# Patient Record
Sex: FEMALE | Race: BLACK OR AFRICAN AMERICAN | NOT HISPANIC OR LATINO | Employment: FULL TIME | RURAL
[De-identification: names, ages, dates, MRNs, and addresses within clinical notes are randomized per-mention and may not be internally consistent; named-entity substitution may affect disease eponyms.]

---

## 2023-07-17 ENCOUNTER — HOSPITAL ENCOUNTER (EMERGENCY)
Facility: HOSPITAL | Age: 48
Discharge: HOME | End: 2023-07-17
Payer: COMMERCIAL

## 2023-07-17 VITALS
TEMPERATURE: 99 F | HEART RATE: 95 BPM | RESPIRATION RATE: 16 BRPM | DIASTOLIC BLOOD PRESSURE: 79 MMHG | BODY MASS INDEX: 24.1 KG/M2 | OXYGEN SATURATION: 97 % | SYSTOLIC BLOOD PRESSURE: 133 MMHG | HEIGHT: 63 IN | WEIGHT: 136 LBS

## 2023-07-17 DIAGNOSIS — S89.90XA KNEE INJURY: Primary | ICD-10-CM

## 2023-07-17 PROCEDURE — 99283 EMERGENCY DEPT VISIT LOW MDM: CPT

## 2023-07-17 PROCEDURE — 99283 EMERGENCY DEPT VISIT LOW MDM: CPT | Mod: ,,, | Performed by: NURSE PRACTITIONER

## 2023-07-17 PROCEDURE — 99283 PR EMERGENCY DEPT VISIT,LEVEL III: ICD-10-PCS | Mod: ,,, | Performed by: NURSE PRACTITIONER

## 2023-07-17 NOTE — DISCHARGE INSTRUCTIONS
Use knee immobilizer and crutches.  Follow-up with orthopedics; you should be contacted with an appointment.  Return to the emergency department for any increase in symptoms or for any other new or worrisome symptoms

## 2023-07-17 NOTE — ED PROVIDER NOTES
Encounter Date: 7/17/2023       History     Chief Complaint   Patient presents with    Leg Pain     Right leg pain     48-year-old female presents to the emergency department to be evaluated for right knee pain.  She jumped down about 2 ft, landed on her feet and injured her right knee 2 days ago and has had right knee pain since that time.  Denies head injury, headache, neck pain, back pain, loss of consciousness.    The history is provided by the patient.   Leg Pain   The incident occurred yesterday. Pertinent negatives include no numbness, no inability to bear weight, no loss of motion, no muscle weakness, no loss of sensation and no tingling.   Review of patient's allergies indicates:   Allergen Reactions    Flagyl [metronidazole]      No past medical history on file.  No past surgical history on file.  No family history on file.     Review of Systems   Constitutional:  Negative for chills and fever.   Respiratory:  Negative for shortness of breath.    Cardiovascular:  Negative for chest pain.   Neurological:  Negative for tingling and numbness.   All other systems reviewed and are negative.    Physical Exam     Initial Vitals [07/17/23 1020]   BP Pulse Resp Temp SpO2   133/79 95 16 98.6 °F (37 °C) 97 %      MAP       --         Physical Exam    Vitals reviewed.  Constitutional: She appears well-developed and well-nourished.   Neck: Neck supple.   Cardiovascular:  Normal rate and regular rhythm.           Pulmonary/Chest: Breath sounds normal.   Abdominal: Abdomen is soft. Bowel sounds are normal. She exhibits no distension and no mass. There is no abdominal tenderness. There is no rebound and no guarding.   Musculoskeletal:         General: Normal range of motion.      Cervical back: Neck supple.      Right upper leg: Normal.      Right knee: Swelling and effusion present. No deformity, erythema, ecchymosis or lacerations. Normal range of motion. Tenderness present.      Right lower leg: Normal.      Neurological: She is alert and oriented to person, place, and time. She has normal strength. GCS score is 15. GCS eye subscore is 4. GCS verbal subscore is 5. GCS motor subscore is 6.   Skin: Skin is warm and dry. Capillary refill takes less than 2 seconds.   Psychiatric: She has a normal mood and affect.       Medical Screening Exam   See Full Note    ED Course   Procedures  Labs Reviewed - No data to display       Imaging Results              X-Ray Knee 1 or 2 View Right (Final result)  Result time 23 10:54:29      Final result by Austin Alex DO (23 10:54:29)                   Impression:      As above.    Point of Service: [facility]      Electronically signed by: Austin Alex  Date:    2023  Time:    10:54               Narrative:    EXAMINATION:  XR KNEE 1 OR 2 VIEW RIGHT    CLINICAL HISTORY:  Unspecified injury of unspecified lower leg, initial encounter    COMPARISON:  None    TECHNIQUE:  Frontal and lateral views of the right knee.    FINDINGS:  No convincing acute fracture or dislocation demonstrated. No concerning radiopaque foreign body visualized.                                       Medications - No data to display  Medical Decision Makin-year-old female presents to the emergency department to be evaluated for right knee pain.  She jumped down about 2 ft, landed on her feet and injured her right knee 2 days ago and has had right knee pain since that time.  Denies head injury, headache, neck pain, back pain, loss of consciousness.  Diagnosis: Knee injury  Patient was given immobilizer and crutches in the emergency department  Patient referred to Orthopedics                       Clinical Impression:   Final diagnoses:  [S89.90XA] Knee injury (Primary)        ED Disposition Condition    Discharge Stable          ED Prescriptions    None       Follow-up Information    None          RORO Givens  23 [id number]

## 2023-07-17 NOTE — Clinical Note
DyllanJeri Paniagua accompanied their family member to the emergency department on 7/17/2023. They may return to work on 07/18/2023.      If you have any questions or concerns, please don't hesitate to call.       RN

## 2023-07-17 NOTE — ED TRIAGE NOTES
Patient presents to ED with complaint of right leg pain after stepping down wrong and it bending back.

## 2023-07-17 NOTE — Clinical Note
"Lucina HALENATALIE Castillo was seen and treated in our emergency department on 7/17/2023.  She may return to work on 07/20/2023.       If you have any questions or concerns, please don't hesitate to call.       RN    "

## 2023-07-17 NOTE — Clinical Note
"Lucina"PERLA Castillo was seen and treated in our emergency department on 7/17/2023.  She may return to work on 07/21/2023.       If you have any questions or concerns, please don't hesitate to call.      Betzy Ly RN    "

## 2023-07-21 ENCOUNTER — OFFICE VISIT (OUTPATIENT)
Dept: ORTHOPEDICS | Facility: CLINIC | Age: 48
End: 2023-07-21
Payer: COMMERCIAL

## 2023-07-21 VITALS
HEIGHT: 63 IN | OXYGEN SATURATION: 97 % | TEMPERATURE: 98 F | DIASTOLIC BLOOD PRESSURE: 74 MMHG | RESPIRATION RATE: 16 BRPM | BODY MASS INDEX: 28.35 KG/M2 | WEIGHT: 160 LBS | SYSTOLIC BLOOD PRESSURE: 124 MMHG | HEART RATE: 76 BPM

## 2023-07-21 DIAGNOSIS — S89.90XA KNEE INJURY: ICD-10-CM

## 2023-07-21 DIAGNOSIS — S80.01XA CONTUSION OF RIGHT KNEE, INITIAL ENCOUNTER: Primary | ICD-10-CM

## 2023-07-21 PROBLEM — R73.01 IMPAIRED FASTING GLUCOSE: Status: ACTIVE | Noted: 2023-07-21

## 2023-07-21 PROBLEM — Z51.89 OTHER SPECIFIED REHABILITATION PROCEDURE: Status: ACTIVE | Noted: 2023-07-21

## 2023-07-21 PROBLEM — M25.612 STIFFNESS OF LEFT SHOULDER JOINT: Status: ACTIVE | Noted: 2023-07-21

## 2023-07-21 PROBLEM — M25.579 ANKLE PAIN: Status: ACTIVE | Noted: 2023-07-21

## 2023-07-21 PROBLEM — R76.11 NONSPECIFIC REACTION TO TUBERCULIN TEST: Status: ACTIVE | Noted: 2023-07-21

## 2023-07-21 PROBLEM — Z23 NEED FOR PROPHYLACTIC VACCINATION WITH TETANUS-DIPHTHERIA (TD): Status: ACTIVE | Noted: 2023-07-21

## 2023-07-21 PROCEDURE — 1159F MED LIST DOCD IN RCRD: CPT | Mod: CPTII,,, | Performed by: NURSE PRACTITIONER

## 2023-07-21 PROCEDURE — 1159F PR MEDICATION LIST DOCUMENTED IN MEDICAL RECORD: ICD-10-PCS | Mod: CPTII,,, | Performed by: NURSE PRACTITIONER

## 2023-07-21 PROCEDURE — 3074F PR MOST RECENT SYSTOLIC BLOOD PRESSURE < 130 MM HG: ICD-10-PCS | Mod: CPTII,,, | Performed by: NURSE PRACTITIONER

## 2023-07-21 PROCEDURE — 1160F PR REVIEW ALL MEDS BY PRESCRIBER/CLIN PHARMACIST DOCUMENTED: ICD-10-PCS | Mod: CPTII,,, | Performed by: NURSE PRACTITIONER

## 2023-07-21 PROCEDURE — 3008F BODY MASS INDEX DOCD: CPT | Mod: CPTII,,, | Performed by: NURSE PRACTITIONER

## 2023-07-21 PROCEDURE — 99215 OFFICE O/P EST HI 40 MIN: CPT | Mod: PBBFAC | Performed by: NURSE PRACTITIONER

## 2023-07-21 PROCEDURE — 3078F PR MOST RECENT DIASTOLIC BLOOD PRESSURE < 80 MM HG: ICD-10-PCS | Mod: CPTII,,, | Performed by: NURSE PRACTITIONER

## 2023-07-21 PROCEDURE — 3078F DIAST BP <80 MM HG: CPT | Mod: CPTII,,, | Performed by: NURSE PRACTITIONER

## 2023-07-21 PROCEDURE — 3074F SYST BP LT 130 MM HG: CPT | Mod: CPTII,,, | Performed by: NURSE PRACTITIONER

## 2023-07-21 PROCEDURE — 99203 PR OFFICE/OUTPT VISIT, NEW, LEVL III, 30-44 MIN: ICD-10-PCS | Mod: S$PBB,,, | Performed by: NURSE PRACTITIONER

## 2023-07-21 PROCEDURE — 99203 OFFICE O/P NEW LOW 30 MIN: CPT | Mod: S$PBB,,, | Performed by: NURSE PRACTITIONER

## 2023-07-21 PROCEDURE — 3008F PR BODY MASS INDEX (BMI) DOCUMENTED: ICD-10-PCS | Mod: CPTII,,, | Performed by: NURSE PRACTITIONER

## 2023-07-21 PROCEDURE — 1160F RVW MEDS BY RX/DR IN RCRD: CPT | Mod: CPTII,,, | Performed by: NURSE PRACTITIONER

## 2023-07-21 RX ORDER — MELOXICAM 15 MG/1
15 TABLET ORAL DAILY PRN
Qty: 30 TABLET | Refills: 1 | Status: SHIPPED | OUTPATIENT
Start: 2023-07-21 | End: 2023-08-20

## 2023-07-21 NOTE — LETTER
July 21, 2023      [facility]  [address]  [facility] [address]  Phone: [phone]  Fax: [phone]       Patient: Lucina Castillo   YOB: 1975  Date of Visit: 07/21/2023    To Whom It May Concern:    KIRILL Castillo  was at [facility] on 07/21/2023. She may return to work/school on 07/24/2023 with no restrictions. If you have any questions or concerns, or if I can be of further assistance, please do not hesitate to contact me.    Sincerely,    ALEXANDER Salazar

## 2023-07-21 NOTE — PATIENT INSTRUCTIONS
Safety guidelines and activity guidelines discussed with the patient.  Verbalized understanding.  We will schedule for physical therapy for range of motion strengthening exercises of her right knee 2 to 3 times a week x6 weeks.  Discontinue knee immobilizer.  May use crutches as she feels she needs.  Prescription Mobic 15 mg 1 p.o. daily p.r.n. knee pain number 30 with 1 refills sent to [facility] on [address] [place], [place] per patient request.  Given work excuse to return to work on 07/24/2023 without restriction.  Return to orthopedic clinic if she fails to improve or sooner as needed.

## 2023-07-21 NOTE — PROGRESS NOTES
48-year-old female presents ambulatory to orthopedic clinic complaint of injury to her right knee.  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  She subsequently presented to [facility] on the following morning where x-rays were obtained of her right knee AP and lateral view which showed no evidence of acute fracture, dislocation or pathologic bone.  She was placed in a knee immobilizer given crutches and referred to orthopedics.  She indicates her pain is in the suprapatellar region.    PE: She is noted be ambulating with crutches nonweightbearing right lower extremity with knee immobilizer in place.  In general she is awake, alert oriented appropriately.  No acute distress noted.  Respirations even unlabored.  Skin is warm dry and intact.  No acute distress noted.  Physical exam right lower extremity she is good motion right hip without elicitation pain.  Physical exam right knee knee immobilizer removed.  Skin is warm dry and intact.  No soft tissue swelling noted.  No intra-articular effusion appreciated clinically.  Range motion right knee 0° extension further flexion approximately 95° which time she experiences discomfort about her knee.  She points to the superior area of the patella as well as mediolateral sides of the patella.  Anterior drawer test negative.  Posterior drawer test is negative.  Valgus stress test is negative.  Varus stress test is negative.  No to palpation quad tendon there is some tenderness initially palpation patella tendon however this was only at the beginning of the exam when palpated again a few minutes later she states there is no pain over the patella tendon.  There is mild tenderness palpation mediolateral joint lines.      Impression:  Hyperextension injury causing contusion right knee     Plan:  Safety guidelines and activity guidelines discussed with the  Spoke with patient, recommended standard COVID precautions and for her to touch base with PCP. Patient verbalized understanding. patient.  Verbalized understanding.  We will schedule for physical therapy for range of motion strengthening exercises of her right knee 2 to 3 times a week x6 weeks.  Discontinue knee immobilizer.  May use crutches as she feels she needs.  Prescription Mobic 15 mg 1 p.o. daily p.r.n. knee pain number 30 with 1 refills sent to [facility] on [address] and [place], [place] per patient request.  Given work excuse to return to work on 07/24/2023 without restriction.  Return to orthopedic clinic if she fails to improve or sooner as needed.

## 2023-07-26 ENCOUNTER — CLINICAL SUPPORT (OUTPATIENT)
Dept: REHABILITATION | Facility: HOSPITAL | Age: 48
End: 2023-07-26
Payer: COMMERCIAL

## 2023-07-26 DIAGNOSIS — S80.01XA CONTUSION OF RIGHT KNEE, INITIAL ENCOUNTER: ICD-10-CM

## 2023-07-26 PROCEDURE — 97162 PT EVAL MOD COMPLEX 30 MIN: CPT

## 2023-07-26 PROCEDURE — 97110 THERAPEUTIC EXERCISES: CPT

## 2023-07-26 NOTE — PLAN OF CARE
[facility]   Physical Therapy Initial Evaluation      Name: Lucina Castillo  Clinic Number: [id number]    Therapy Diagnosis:   Encounter Diagnosis   Name Primary?    Contusion of right knee, initial encounter         Physician: Jose Carreon FNP    Physician Orders: PT Eval and Treat  Medical Diagnosis from Referral: contusion of R knee  Evaluation Date: 7/26/2023  Authorization Period Expiration: 07/20/24  Plan of Care Expiration: 08/25/23  Progress Note Due: 08/25/23  Visit # / Visits authorized: 1/ Pending   FOTO: 59 at IE    Precautions: Standard     Time In: 04:16PM  Time Out: 04:50PM  Total Appointment Time (timed & untimed codes): 34 minutes    Subjective     Date of onset: 7/15/23    History of current condition - LUCINA reports to physical therapy with reports of R knee pain dating back to 7/15/23 when she was doing yard work and jumped off a small ledge and felt her knee hyperextend and buckle.  States the knee swelled up almost immediately and it was difficult to walk.  On the 17th she finally went to the ER due to her knee not feeling any better and had x-rays taken with no significant findings.  Was given an immobilizer and referred to orthopedics.  Saw Jose Carreon on the 21st and prescribed mobic and d/c'd from immobilizer.  Referred her for therapy at that time as well.    At this point reports that her knee really hasn't seemed to improve.  No longer using crutches, just walking through the pain.  States that bending her knee really worsens her pain the most, not so much pain with straightening the knee.  If she sits for too long and it gets stiff she will really struggle from a pain standpoint.  Will get some pain at night when trying to sleep will get increased pain if she twists or rolls over a certain way.  Hasn't noticed any clicking, catching, or popping in the knee.  Struggles going up and down stairs, with going up stairs worse than down.  Able to drive but  does have pain in the knee with driving.     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  Was in the  prior to this job.  Lives alone in a single story home with multiple steps up to the front door.     Falls: None    Imaging: Radiographs in the ER with no significant findings.     Prior Therapy: For shoulder injury over 2 years ago.    Social History: Lives alone  Occupation:  at [employer]  Prior Level of Function: No limitations  Current Level of Function: Mod pain w/ ambulation, stair negotiation, and work tasks.     Pain:  Current 4/10, worst 8/10, best 2/10   Location: Diffuse R knee more lateral knee at patellar tendon.   Description: Throbbing and Tight  Aggravating Factors: Standing, Bending, Walking, Flexing, and Getting out of bed/chair  Easing Factors: ice and rest    Patients goals: to be able to work through full shift without pain.      Medical History:   No past medical history on file.    Surgical History:   Lucina Castillo  has no past surgical history on file.    Medications:   Lucina has a current medication list which includes the following prescription(s): meloxicam.    Allergies:   Review of patient's allergies indicates:   Allergen Reactions    Flagyl [metronidazole]         Objective      Observation : Amb w/ slight quad avoidance gait on the R knee w/ mild infrapatellar swelling present and tenderness at lateral jt line.        Range of Motion/Strength :           Left Extremity                                                               Right Extremity   AROM Strength  Location  AROM   Strength   130 5/5   Knee    Flexion (140) 77 3-/5   +3 5/5                Extension (0) +1 3-/5     No lag with SLR on L, -6 deg lag from +1 deg to -5 deg on R.     Knee Special Tests :     B. Knee  Lochman's test: right Negative left Negative  Anterior drawer: right Negative left Negative  Posterior drawer: right Negative left Negative  Varus stress test: right  Negative left Negative  Valgus stress test: right Negative left Negative  PFJ grind test: right Negative left Positive  McMurrays: right Negative left Positive    Functional Impairments :  Unable to perform a bodyweight squat, unable to negotiate stairs reciprocally, unable to perform a lateral step down at this time due to pain in R knee.     Limitation/Restriction for FOTO Knee Survey    Therapist reviewed FOTO scores for Lucina Castillo on 7/26/2023.   FOTO documents entered into [employer] - see Media section.    Limitation Score: 41%         Treatment     Total Treatment time (time-based codes) separate from Evaluation: 12 minutes     LUCINA received the treatments listed below:  THERAPEUTIC EXERCISES to develop strength, ROM, and flexibility for 12 minutes including [place] SLR [id number] holds, Heel slide w/ ankle pump x 3 min, Towel Calf Stretch [id number].       Patient Education and Home Exercises     Education provided:   - Diagnosis, prognosis, plan of care    Written Home Exercises Provided: yes. Exercises were reviewed and LUCINA was able to demonstrate them prior to the end of the session.  LUCINA demonstrated good  understanding of the education provided. See EMR under Patient Instructions for exercises provided during therapy sessions.    Assessment     Lucina is a 48 y.o. female referred to outpatient Physical Therapy with a medical diagnosis of contusion of R knee. Patient presents with decreased R knee ROM, strength, flexibility, and functional capacity as a result of recent injury.  Ligamentously the knee feels darío at this time but with possible meniscal irritation and signs most consistent with patellofemoral irritation at this point.  Will work to address the above deficits through skilled therapy at this time.      Patient prognosis is Good.   Patient will benefit from skilled outpatient Physical Therapy to address the deficits stated above and in the chart below, provide patient /family education,  "and to maximize patientt's level of independence.     Plan of care discussed with patient: Yes  Patient's spiritual, cultural and educational needs considered and patient is agreeable to the plan of care and goals as stated below:     Anticipated Barriers for therapy: high demand work activities, possible fear avoidance behaviors.     Medical Necessity is demonstrated by the following  History  Co-morbidities and personal factors that may impact the plan of care [] LOW: no personal factors / co-morbidities  [x] MODERATE: 1-2 personal factors / co-morbidities  [] HIGH: 3+ personal factors / co-morbidities    Moderate / High Support Documentation:   Co-morbidities affecting plan of care: high demand work activities, possible fear avoidance behaviors.     Personal Factors:   coping style     Examination  Body Structures and Functions, activity limitations and participation restrictions that may impact the plan of care [] LOW: addressing 1-2 elements  [x] MODERATE: 3+ elements  [] HIGH: 4+ elements (please support below)    Moderate / High Support Documentation: ROM, strength, flexibility, functional deficits of R knee.      Clinical Presentation [] LOW: stable  [x] MODERATE: Evolving  [] HIGH: Unstable     Decision Making/ Complexity Score: moderate       Goals:  Short Term Goals: 2 weeks   Pt will be independent with HEP for continued progression beyond skilled therapy.  Pt will be able to sleep through the night without waking due to R knee pain.  Pt will present to therapy with 0 deg resting R knee extension with no lag with SLR to demonstrate improved terminal extensor mechanism.  Pt will be able to return to driving without pain or limitation from R knee.      Long Term Goals: 4 weeks   Pt will be able to negotiate stairs reciprocally without pain or limitation from R LE.  Pt will be able to perform a lateral step down from a 6" step without pain or limitation from R knee.   Pt will be able to perform a bodyweight " squat without pain or limitation from R knee.   Pt will be able to amb I without deviation or AD.  Plan     Plan of care Certification: 7/26/2023 to 08/25/23.    Outpatient Physical Therapy 2 times weekly for 4 weeks to include the following interventions: Electrical Stimulation Unattended NMES, Manual Therapy, Neuromuscular Re-ed, Patient Education, Therapeutic Activities, and Therapeutic Exercise.     Simeon Michelle, PT

## 2023-08-03 ENCOUNTER — CLINICAL SUPPORT (OUTPATIENT)
Dept: REHABILITATION | Facility: HOSPITAL | Age: 48
End: 2023-08-03
Payer: COMMERCIAL

## 2023-08-03 DIAGNOSIS — S80.01XA CONTUSION OF RIGHT KNEE, INITIAL ENCOUNTER: Primary | ICD-10-CM

## 2023-08-03 DIAGNOSIS — R26.2 DIFFICULTY WALKING: ICD-10-CM

## 2023-08-03 DIAGNOSIS — M25.561 ACUTE PAIN OF RIGHT KNEE: ICD-10-CM

## 2023-08-03 DIAGNOSIS — M25.661 STIFFNESS OF RIGHT KNEE: ICD-10-CM

## 2023-08-03 PROCEDURE — 97530 THERAPEUTIC ACTIVITIES: CPT

## 2023-08-03 PROCEDURE — 97110 THERAPEUTIC EXERCISES: CPT

## 2023-08-03 PROCEDURE — 97112 NEUROMUSCULAR REEDUCATION: CPT

## 2023-08-03 NOTE — PROGRESS NOTES
"    Physical Therapy Treatment Note     Name: Lucina Castillo  Clinic Number: [id number]    Therapy Diagnosis: No diagnosis found.  Physician: Jose Carreon FNP    Visit Date: 8/3/2023    Physician Orders: PT Eval and Treat  Medical Diagnosis from Referral: contusion of R knee  Evaluation Date: 7/26/2023  Authorization Period Expiration: 07/20/24  Plan of Care Expiration: 08/25/23  Progress Note Due: 08/25/23  Visit # / Visits authorized: 2/9  FOTO: 59 at IE  PTA Visit #: 0/5    Time In: 04:03PM  Time Out: 04:48PM  Total Billable Time: 45 minutes    Precautions: Standard  Functional Level Prior to Evaluation: no limitations    Subjective     Pt reports: knee feeling a bit better.  Has done the HEP exercises "some".    Response to previous treatment: fair initial response  Functional change: working at this point with mod pain    Pain: 4/10  Location: R knee    Objective     LUCINA received therapeutic exercises to develop strength, endurance, ROM, and flexibility for 23 minutes including:  [place] SLR [id number] holds  Supine Ball Rolling Flexions x20  Prone Quad Sets 50m78eak  Prone Quad Stretch 6s68nvd  Hamstring Curls [id number]  Slant Board Stretch 5i12vfy    LUCINA participated in neuromuscular re-education activities to improve: Balance, Coordination, Kinesthetic, Sense, and Proprioception for 14 minutes. The following activities were included:  Long Bridging 02m7hou  QS w/ WS on slant Board 15p8mwp  Leg Press [id number] w/ rock into terminal extension    LUCINA participated in dynamic functional therapeutic activities to improve functional performance for 8  minutes, including:  Upright bike x 8 min    Home Exercises Provided and Patient Education Provided     Education provided: Review of HEP     Written Home Exercises Provided: Patient instructed to cont prior HEP.  Exercises were reviewed and LUCINA was able to demonstrate them prior to the end of the session.  LUCINA demonstrated good  understanding of " "the education provided.     See EMR under Patient Instructions for exercises provided prior visit.    Assessment     Reports feeling better and being on her feet all day at work today.  Still signs and symptoms just point to general contusion at this point with no clear pathology in the knee with pt really unable to verbalize symptoms that she is experiencing other than a general pulling when walking, and really can't even say where she is experiencing the pulling.  Did well with all exercises performed today, only reporting real discomfort with hamstring curls. Will continue with current plan at this time.   CHARLOTTE Is progressing well towards her goals.   Pt prognosis is Good.     Pt will continue to benefit from skilled outpatient physical therapy to address the deficits listed in the problem list box on initial evaluation, provide pt/family education and to maximize pt's level of independence in the home and community environment.     Pt's spiritual, cultural and educational needs considered and pt agreeable to plan of care and goals.     Anticipated barriers to physical therapy: possible symptom magnification, fear avoidance behaviors.     Goals:  Short Term Goals: 2 weeks   Pt will be independent with HEP for continued progression beyond skilled therapy.  Pt will be able to sleep through the night without waking due to R knee pain.  Pt will present to therapy with 0 deg resting R knee extension with no lag with SLR to demonstrate improved terminal extensor mechanism.  Pt will be able to return to driving without pain or limitation from R knee.      Long Term Goals: 4 weeks   Pt will be able to negotiate stairs reciprocally without pain or limitation from R LE.  Pt will be able to perform a lateral step down from a 6" step without pain or limitation from R knee.   Pt will be able to perform a bodyweight squat without pain or limitation from R knee.   Pt will be able to amb I without deviation or AD.  Plan    "   Plan of care Certification: 7/26/2023 to 08/25/23.     Outpatient Physical Therapy 2 times weekly for 4 weeks to include the following interventions: Electrical Stimulation Unattended NMES, Manual Therapy, Neuromuscular Re-ed, Patient Education, Therapeutic Activities, and Therapeutic Exercise.     Simeon Michelle, PT  8/3/2023

## 2023-08-08 ENCOUNTER — CLINICAL SUPPORT (OUTPATIENT)
Dept: REHABILITATION | Facility: HOSPITAL | Age: 48
End: 2023-08-08
Payer: COMMERCIAL

## 2023-08-08 DIAGNOSIS — S80.01XA CONTUSION OF RIGHT KNEE, INITIAL ENCOUNTER: Primary | ICD-10-CM

## 2023-08-08 DIAGNOSIS — M25.561 ACUTE PAIN OF RIGHT KNEE: ICD-10-CM

## 2023-08-08 PROCEDURE — 97530 THERAPEUTIC ACTIVITIES: CPT | Mod: CQ

## 2023-08-08 PROCEDURE — 97112 NEUROMUSCULAR REEDUCATION: CPT | Mod: CQ

## 2023-08-08 PROCEDURE — 97110 THERAPEUTIC EXERCISES: CPT | Mod: CQ

## 2023-08-08 NOTE — PROGRESS NOTES
"    Physical Therapy Treatment Note     Name: Lucina Castillo  Clinic Number: [id number]    Therapy Diagnosis:   Encounter Diagnoses   Name Primary?    Contusion of right knee, initial encounter Yes    Acute pain of right knee      Physician: Jose Carreon FNP    Visit Date: 8/8/2023    Physician Orders: PT Eval and Treat  Medical Diagnosis from Referral: contusion of R knee  Evaluation Date: 7/26/2023  Authorization Period Expiration: 07/20/24  Plan of Care Expiration: 08/25/23  Progress Note Due: 08/25/23  Visit # / Visits authorized: 3/9  FOTO: 59 at IE  PTA Visit #: 1/5    Time In: 4:52 pm  Time Out: 5:32 pm  Total Billable Time: 40 minutes    Precautions: Standard  Functional Level Prior to Evaluation: no limitations    Subjective     Pt reports: she was sore after last session; feeling better overall    Response to previous treatment: fair initial response  Functional change: working at this point with mod pain    Pain: 4/10  Location: R knee    Objective     LUCINA received therapeutic exercises to develop strength, endurance, ROM, and flexibility for 23 minutes including:  [place] SLR 6g7z8miq holds  Supine Ball Rolling Flexions x20  Prone Quad Sets 01l27rxw  Prone Quad Stretch 1d69niw  Hamstring Curls 9i93j97vyv  Slant Board Stretch 4n85uxa    LUCINA participated in neuromuscular re-education activities to improve: Balance, Coordination, Kinesthetic, Sense, and Proprioception for 9 minutes. The following activities were included:  Long Bridging 94r0qwt  QS w/ WS on slant Board 74t3wct  Leg Press 8k62o57vpt w/ rock into terminal extension      LUCINA participated in dynamic functional therapeutic activities to improve functional performance for 8  minutes, including:  Upright bike x 5 min  Step Up 4" 15x (B)    Home Exercises Provided and Patient Education Provided     Education provided: Review of HEP     Written Home Exercises Provided: Patient instructed to cont prior HEP.  Exercises were reviewed and " "CHARLOTTE was able to demonstrate them prior to the end of the session.  CHARLOTTE demonstrated good  understanding of the education provided.     See EMR under Patient Instructions for exercises provided prior visit.    Assessment     Pt is doing well just has some pain with exercises. Able to progress CKC exercises with some pain/fatigue noted. Pt is making good progress towards her goals just needs to continue to work to address weakness in quads. Will continue to progress as able.     CHARLOTTE Is progressing well towards her goals.   Pt prognosis is Good.     Pt will continue to benefit from skilled outpatient physical therapy to address the deficits listed in the problem list box on initial evaluation, provide pt/family education and to maximize pt's level of independence in the home and community environment.     Pt's spiritual, cultural and educational needs considered and pt agreeable to plan of care and goals.     Anticipated barriers to physical therapy: possible symptom magnification, fear avoidance behaviors.     Goals:  Short Term Goals: 2 weeks   Pt will be independent with HEP for continued progression beyond skilled therapy.  Pt will be able to sleep through the night without waking due to R knee pain.  Pt will present to therapy with 0 deg resting R knee extension with no lag with SLR to demonstrate improved terminal extensor mechanism.  Pt will be able to return to driving without pain or limitation from R knee.      Long Term Goals: 4 weeks   Pt will be able to negotiate stairs reciprocally without pain or limitation from R LE.  Pt will be able to perform a lateral step down from a 6" step without pain or limitation from R knee.   Pt will be able to perform a bodyweight squat without pain or limitation from R knee.   Pt will be able to amb I without deviation or AD.  Plan      Plan of care Certification: 7/26/2023 to 08/25/23.     Outpatient Physical Therapy 2 times weekly for 4 weeks to include the " following interventions: Electrical Stimulation Unattended NMES, Manual Therapy, Neuromuscular Re-ed, Patient Education, Therapeutic Activities, and Therapeutic Exercise.     Vishnu Purcell, PTA  8/8/2023

## 2023-08-10 ENCOUNTER — CLINICAL SUPPORT (OUTPATIENT)
Dept: REHABILITATION | Facility: HOSPITAL | Age: 48
End: 2023-08-10
Payer: COMMERCIAL

## 2023-08-10 DIAGNOSIS — S80.01XA CONTUSION OF RIGHT KNEE, INITIAL ENCOUNTER: Primary | ICD-10-CM

## 2023-08-10 DIAGNOSIS — M25.661 STIFFNESS OF RIGHT KNEE: ICD-10-CM

## 2023-08-10 PROCEDURE — 97110 THERAPEUTIC EXERCISES: CPT | Mod: CQ

## 2023-08-10 PROCEDURE — 97530 THERAPEUTIC ACTIVITIES: CPT | Mod: CQ

## 2023-08-10 PROCEDURE — 97112 NEUROMUSCULAR REEDUCATION: CPT | Mod: CQ

## 2023-08-10 NOTE — PROGRESS NOTES
"    Physical Therapy Treatment Note     Name: Lucina Castillo  Clinic Number: [id number]    Therapy Diagnosis:   Encounter Diagnoses   Name Primary?    Contusion of right knee, initial encounter Yes    Stiffness of right knee      Physician: Jose Carreon FNP    Visit Date: 8/10/2023    Physician Orders: PT Eval and Treat  Medical Diagnosis from Referral: contusion of R knee  Evaluation Date: 7/26/2023  Authorization Period Expiration: 07/20/24  Plan of Care Expiration: 08/25/23  Progress Note Due: 08/25/23  Visit # / Visits authorized: 4/9  FOTO: 59 at IE  PTA Visit #: 2/5    Time In: 1:47 pm  Time Out:  2:47 pm  Total Billable Time:  54 minutes  (6 min IM to R knee not billed) ( Simeon Michelle, PT participated in 13 min of treatment)    Precautions: Standard  Functional Level Prior to Evaluation: no limitations    Subjective     Pt reports: no pain @ rest.  Reports pain in knee with increased flexion.      Response to previous treatment: fair initial response  Functional change: working at this point with mod pain    Pain: 0/10  Location: R knee    Objective     LUCINA received therapeutic exercises to develop strength, endurance, ROM, and flexibility for 23 minutes including:  [place] SLR 1z8d3bny holds--2#  Supine Ball Rolling Flexions x20  Prone Quad Sets 62q33wyk  Prone Quad Stretch 3o99iyg  Hurdler Stretch--throughout session--30 sec holds    LUCINA participated in neuromuscular re-education activities to improve: Balance, Coordination, Kinesthetic, Sense, and Proprioception for 23 minutes. The following activities were included:  Long Bridging 39l1xrn  Leg Press 9x62z66msw w/ rock into terminal extension--SL lowering  Eccentric lowering--4" 4 x 5      LUCINA participated in dynamic functional therapeutic activities to improve functional performance for 8  minutes, including:  Upright bike x 8 min    IM to R knee x 6 min      Home Exercises Provided and Patient Education Provided     Education provided: " "Review of HEP     Written Home Exercises Provided: Patient instructed to cont prior HEP.  Exercises were reviewed and CHARLOTTE was able to demonstrate them prior to the end of the session.  CHARLOTTE demonstrated good  understanding of the education provided.     See EMR under Patient Instructions for exercises provided prior visit.    Assessment     Arrives without pain in R knee today.  Reports increased pain in patellar tendon with palpation and Increased knee flexion.  Most exercises were eccentric today.  Increased frequency of hurdler stretches today and ended with IM to patellar tendon.  Patient's complaints of pain have not been consistent from treatment to treatment.  Will continue to progress as able.     CHARLOTTE Is progressing well towards her goals.   Pt prognosis is Good.     Pt will continue to benefit from skilled outpatient physical therapy to address the deficits listed in the problem list box on initial evaluation, provide pt/family education and to maximize pt's level of independence in the home and community environment.     Pt's spiritual, cultural and educational needs considered and pt agreeable to plan of care and goals.     Anticipated barriers to physical therapy: possible symptom magnification, fear avoidance behaviors.     Goals:  Short Term Goals: 2 weeks   Pt will be independent with HEP for continued progression beyond skilled therapy.  Pt will be able to sleep through the night without waking due to R knee pain.  Pt will present to therapy with 0 deg resting R knee extension with no lag with SLR to demonstrate improved terminal extensor mechanism.  Pt will be able to return to driving without pain or limitation from R knee.      Long Term Goals: 4 weeks   Pt will be able to negotiate stairs reciprocally without pain or limitation from R LE.  Pt will be able to perform a lateral step down from a 6" step without pain or limitation from R knee.   Pt will be able to perform a bodyweight squat " without pain or limitation from R knee.   Pt will be able to amb I without deviation or AD.  Plan      Plan of care Certification: 7/26/2023 to 08/25/23.     Outpatient Physical Therapy 2 times weekly for 4 weeks to include the following interventions: Electrical Stimulation Unattended NMES, Manual Therapy, Neuromuscular Re-ed, Patient Education, Therapeutic Activities, and Therapeutic Exercise.     Trever Adams, PTA  8/10/2023

## 2023-08-15 ENCOUNTER — CLINICAL SUPPORT (OUTPATIENT)
Dept: REHABILITATION | Facility: HOSPITAL | Age: 48
End: 2023-08-15
Payer: COMMERCIAL

## 2023-08-15 DIAGNOSIS — R26.2 DIFFICULTY WALKING: ICD-10-CM

## 2023-08-15 DIAGNOSIS — M25.561 ACUTE PAIN OF RIGHT KNEE: ICD-10-CM

## 2023-08-15 DIAGNOSIS — S80.01XA CONTUSION OF RIGHT KNEE, INITIAL ENCOUNTER: Primary | ICD-10-CM

## 2023-08-15 DIAGNOSIS — M25.661 STIFFNESS OF RIGHT KNEE: ICD-10-CM

## 2023-08-15 PROCEDURE — 97110 THERAPEUTIC EXERCISES: CPT

## 2023-08-15 PROCEDURE — 97112 NEUROMUSCULAR REEDUCATION: CPT

## 2023-08-15 PROCEDURE — 97530 THERAPEUTIC ACTIVITIES: CPT

## 2023-08-15 NOTE — PROGRESS NOTES
"Physical Therapy Treatment Note     Name: Lucina Castillo  Clinic Number: [id number]    Therapy Diagnosis:   No diagnosis found.    Physician: Jose Carreon FNP    Visit Date: 8/15/2023    Physician Orders: PT Eval and Treat  Medical Diagnosis from Referral: contusion of R knee  Evaluation Date: 7/26/2023  Authorization Period Expiration: 07/20/24  Plan of Care Expiration: 08/25/23  Progress Note Due: 08/25/23  Visit # / Visits authorized: 5/9  FOTO: 59 at IE  PTA Visit #: 0/5    Time In: 04:47PM  Time Out:  05:30PM  Total Billable Time:  43 min    Precautions: Standard  Functional Level Prior to Evaluation: no limitations    Subjective     Pt reports: states the knee hurting a bit and feeling swollen on arrival.  Reports "some" compliance with HEP.     Response to previous treatment: fair initial response  Functional change: working at this point with mod pain    Pain: 5/10  Location: R knee    Objective     LUCINA received therapeutic exercises to develop strength, endurance, ROM, and flexibility for 12 minutes including:  Hamstring Curls 5i14l50ofq  [place] SLR 3l9z9bzb holds--2#  Prone Quad Sets 71l74wuk  Prone Quad Stretch 6v83hpu  Weighted Raman Stretch 6v0c8wsc each w/ 3lb cuff weights  Hurdler Stretch--throughout session--30 sec holds    LUCINA participated in neuromuscular re-education activities to improve: Balance, Coordination, Kinesthetic, Sense, and Proprioception for 23 minutes. The following activities were included:  QS w/ WS on slant Board 45n8xcw  Long Bridging 61f2uej  Leg Press 8n80f04xyv w/ rock into terminal extension--SL lowering  TRX Squats 3x10   Eccentric lowering--4" 2x5 (stopped due to pain in anteromedial knee)      LUCINA participated in dynamic functional therapeutic activities to improve functional performance for 8  minutes, including:  Upright bike x 8 min    Home Exercises Provided and Patient Education Provided     Education provided: Review of HEP     Written Home Exercises " "Provided: Patient instructed to cont prior HEP.  Exercises were reviewed and CHARLOTTE was able to demonstrate them prior to the end of the session.  CHARLOTTE demonstrated good  understanding of the education provided.     See EMR under Patient Instructions for exercises provided prior visit.    Assessment     Pain still dancing around at this point.  Didn't appear swollen and no effusion with stroke test but signs mostly pointing at this time to patellofemoral symptoms.  Would like to get pt more compliant with HEP and see if we can make some improvement here.  May suggest an orthotic at next visit to see if this alleviates some of her pain at work as well.  Will continue on for now.     CHARLOTTE Is progressing well towards her goals.   Pt prognosis is Good.     Pt will continue to benefit from skilled outpatient physical therapy to address the deficits listed in the problem list box on initial evaluation, provide pt/family education and to maximize pt's level of independence in the home and community environment.     Pt's spiritual, cultural and educational needs considered and pt agreeable to plan of care and goals.     Anticipated barriers to physical therapy: possible symptom magnification, fear avoidance behaviors.     Goals:  Short Term Goals: 2 weeks   Pt will be independent with HEP for continued progression beyond skilled therapy.  Pt will be able to sleep through the night without waking due to R knee pain.  Pt will present to therapy with 0 deg resting R knee extension with no lag with SLR to demonstrate improved terminal extensor mechanism.  Pt will be able to return to driving without pain or limitation from R knee.      Long Term Goals: 4 weeks   Pt will be able to negotiate stairs reciprocally without pain or limitation from R LE.  Pt will be able to perform a lateral step down from a 6" step without pain or limitation from R knee.   Pt will be able to perform a bodyweight squat without pain or limitation " from R knee.   Pt will be able to amb I without deviation or AD.  Plan      Plan of care Certification: 7/26/2023 to 08/25/23.     Outpatient Physical Therapy 2 times weekly for 4 weeks to include the following interventions: Electrical Stimulation Unattended NMES, Manual Therapy, Neuromuscular Re-ed, Patient Education, Therapeutic Activities, and Therapeutic Exercise.     Simeon Michelle, PT  8/15/2023

## 2023-08-17 ENCOUNTER — CLINICAL SUPPORT (OUTPATIENT)
Dept: REHABILITATION | Facility: HOSPITAL | Age: 48
End: 2023-08-17
Payer: COMMERCIAL

## 2023-08-17 DIAGNOSIS — S80.01XA CONTUSION OF RIGHT KNEE, INITIAL ENCOUNTER: Primary | ICD-10-CM

## 2023-08-17 PROCEDURE — 97140 MANUAL THERAPY 1/> REGIONS: CPT | Mod: CQ

## 2023-08-17 PROCEDURE — 97110 THERAPEUTIC EXERCISES: CPT | Mod: CQ

## 2023-08-17 PROCEDURE — 97112 NEUROMUSCULAR REEDUCATION: CPT | Mod: CQ

## 2023-08-17 NOTE — PROGRESS NOTES
"Physical Therapy Treatment Note     Name: Lucina Castillo  Clinic Number: [id number]    Therapy Diagnosis:   Encounter Diagnosis   Name Primary?    Contusion of right knee, initial encounter Yes       Physician: Jose Carreon FNP    Visit Date: 8/17/2023    Physician Orders: PT Eval and Treat  Medical Diagnosis from Referral: contusion of R knee  Evaluation Date: 7/26/2023  Authorization Period Expiration: 07/20/24  Plan of Care Expiration: 08/25/23  Progress Note Due: 08/25/23  Visit # / Visits authorized: 6/9  FOTO: 59 at IE  PTA Visit #: 1/5    Time In: 04:48 PM  Time Out:  05:29 pm  Total Billable Time:  41 min    Precautions: Standard  Functional Level Prior to Evaluation: no limitations    Subjective     Pt reports: she has some pain when on the bike but overall is improving    Response to previous treatment: fair initial response  Functional change: working at this point with mod pain    Pain: 5 /10  Location: R knee    Objective     LUCINA received therapeutic exercises to develop strength, endurance, ROM, and flexibility for 10 minutes including:  Hamstring Curls 3e31q31cbg  [place] SLR 3m4e6luk holds--2#  Prone Quad Sets 63b84yjp  Prone Quad Stretch 9d87xir  Weighted Raman Stretch 8g9d3lys each w/ 3lb cuff weights  Hurdler Stretch--throughout session--30 sec holds    LUCINA participated in neuromuscular re-education activities to improve: Balance, Coordination, Kinesthetic, Sense, and Proprioception for 23 minutes. The following activities were included:  QS w/ WS on slant Board 80p4toa  Long Bridging 80h0ocm  Leg Press 6c57w54mxe w/ rock into terminal extension--SL lowering  TRX Squats 3x10   Eccentric lowering--4" 2x5 (stopped due to pain in anteromedial knee)      LUCINA participated in dynamic functional therapeutic activities to improve functional performance for 8  minutes, including:  Upright bike x 8 min    Home Exercises Provided and Patient Education Provided     Education provided: Review of " "HEP     Written Home Exercises Provided: Patient instructed to cont prior HEP.  Exercises were reviewed and CHARLOTTE was able to demonstrate them prior to the end of the session.  CHARLOTTE demonstrated good  understanding of the education provided.     See EMR under Patient Instructions for exercises provided prior visit.    Assessment     Pt tolerated all therapeutic exercises with fatigue noted. Some pain when knee gets >90 degrees of flexion on leg press. Pt has some arch collapse when ambulating so pt was shown orthotics to correct that hopefully will help with PF pain. Will continue to progress as able. Educated pt to keep up with her HEP diligently.     CHARLOTTE Is progressing well towards her goals.   Pt prognosis is Good.     Pt will continue to benefit from skilled outpatient physical therapy to address the deficits listed in the problem list box on initial evaluation, provide pt/family education and to maximize pt's level of independence in the home and community environment.     Pt's spiritual, cultural and educational needs considered and pt agreeable to plan of care and goals.     Anticipated barriers to physical therapy: possible symptom magnification, fear avoidance behaviors.     Goals:  Short Term Goals: 2 weeks   Pt will be independent with HEP for continued progression beyond skilled therapy.  Pt will be able to sleep through the night without waking due to R knee pain.  Pt will present to therapy with 0 deg resting R knee extension with no lag with SLR to demonstrate improved terminal extensor mechanism.  Pt will be able to return to driving without pain or limitation from R knee.      Long Term Goals: 4 weeks   Pt will be able to negotiate stairs reciprocally without pain or limitation from R LE.  Pt will be able to perform a lateral step down from a 6" step without pain or limitation from R knee.   Pt will be able to perform a bodyweight squat without pain or limitation from R knee.   Pt will be able " to amb I without deviation or AD.  Plan      Plan of care Certification: 7/26/2023 to 08/25/23.     Outpatient Physical Therapy 2 times weekly for 4 weeks to include the following interventions: Electrical Stimulation Unattended NMES, Manual Therapy, Neuromuscular Re-ed, Patient Education, Therapeutic Activities, and Therapeutic Exercise.     Vishnu Purcell, PTA  8/17/2023

## 2023-08-22 ENCOUNTER — CLINICAL SUPPORT (OUTPATIENT)
Dept: REHABILITATION | Facility: HOSPITAL | Age: 48
End: 2023-08-22
Payer: COMMERCIAL

## 2023-08-22 DIAGNOSIS — R26.2 DIFFICULTY WALKING: ICD-10-CM

## 2023-08-22 DIAGNOSIS — M25.561 ACUTE PAIN OF RIGHT KNEE: ICD-10-CM

## 2023-08-22 DIAGNOSIS — M25.661 STIFFNESS OF RIGHT KNEE: Primary | ICD-10-CM

## 2023-08-22 PROCEDURE — 97530 THERAPEUTIC ACTIVITIES: CPT

## 2023-08-22 PROCEDURE — 97112 NEUROMUSCULAR REEDUCATION: CPT

## 2023-08-22 PROCEDURE — 97110 THERAPEUTIC EXERCISES: CPT

## 2023-08-22 NOTE — PROGRESS NOTES
"Physical Therapy Treatment Note     Name: Lucina Castillo  Clinic Number: [id number]    Therapy Diagnosis:   No diagnosis found.      Physician: Jose Carreon FNP    Visit Date: 8/22/2023    Physician Orders: PT Eval and Treat  Medical Diagnosis from Referral: contusion of R knee  Evaluation Date: 7/26/2023  Authorization Period Expiration: 07/20/24  Plan of Care Expiration: 08/25/23  Progress Note Due: 08/25/23  Visit # / Visits authorized: 7/9  FOTO: 59 at IE  PTA Visit #: 0/5    Time In: 04:46PM  Time Out:  05:26PM  Total Billable Time:  40 min    Precautions: Standard  Functional Level Prior to Evaluation: no limitations    Subjective     Pt reports: states the knee feel the best it has felt in a while on arrival.  No pain.     Response to previous treatment: fair initial response  Functional change: working at this point with mod pain    Pain: 0/10  Location: R knee    Objective     LUCINA received therapeutic exercises to develop strength, endurance, ROM, and flexibility for 9 minutes including:  Hamstring Curls 4w46z94vls  Prone Quad Sets 56x00paw  Prone Quad Stretch 2f53kva  Hurdler Stretch--throughout session--30 sec holds    LUCINA participated in neuromuscular re-education activities to improve: Balance, Coordination, Kinesthetic, Sense, and Proprioception for 23 minutes. The following activities were included:  QS w/ WS on slant Board 87n3euv  Long Bridging 59g5qqs  Leg Press 7k40d68sov w/ rock into terminal extension--SL lowering  TRX Squats 71g0wdx  Step Ups 4x5 to 6" step      LUCINA participated in dynamic functional therapeutic activities to improve functional performance for 8  minutes, including:  Upright bike x 8 min    Home Exercises Provided and Patient Education Provided     Education provided: Review of HEP     Written Home Exercises Provided: Patient instructed to cont prior HEP.  Exercises were reviewed and LUCINA was able to demonstrate them prior to the end of the session.  LUCINA " "demonstrated good  understanding of the education provided.     See EMR under Patient Instructions for exercises provided prior visit.    Assessment     Really continued with prior activities since the knee has been feeling good.  Did well with all exercises performed today, her only complaint being a rubber band type feeling in the front of the knee during step ups and leg press.  Felt fine after though.  Will continue through remaining two visits and then discharge.     CHARLOTTE Is progressing well towards her goals.   Pt prognosis is Good.     Pt will continue to benefit from skilled outpatient physical therapy to address the deficits listed in the problem list box on initial evaluation, provide pt/family education and to maximize pt's level of independence in the home and community environment.     Pt's spiritual, cultural and educational needs considered and pt agreeable to plan of care and goals.     Anticipated barriers to physical therapy: possible symptom magnification, fear avoidance behaviors.     Goals:  Short Term Goals: 2 weeks   Pt will be independent with HEP for continued progression beyond skilled therapy.  Pt will be able to sleep through the night without waking due to R knee pain.  Pt will present to therapy with 0 deg resting R knee extension with no lag with SLR to demonstrate improved terminal extensor mechanism.  Pt will be able to return to driving without pain or limitation from R knee.      Long Term Goals: 4 weeks   Pt will be able to negotiate stairs reciprocally without pain or limitation from R LE.  Pt will be able to perform a lateral step down from a 6" step without pain or limitation from R knee.   Pt will be able to perform a bodyweight squat without pain or limitation from R knee.   Pt will be able to amb I without deviation or AD.  Plan      Plan of care Certification: 7/26/2023 to 08/25/23.     Outpatient Physical Therapy 2 times weekly for 4 weeks to include the following " interventions: Electrical Stimulation Unattended NMES, Manual Therapy, Neuromuscular Re-ed, Patient Education, Therapeutic Activities, and Therapeutic Exercise.     Simeon Michelle, PT  8/22/2023

## 2023-08-24 ENCOUNTER — CLINICAL SUPPORT (OUTPATIENT)
Dept: REHABILITATION | Facility: HOSPITAL | Age: 48
End: 2023-08-24
Payer: COMMERCIAL

## 2023-08-24 DIAGNOSIS — R26.2 DIFFICULTY WALKING: ICD-10-CM

## 2023-08-24 DIAGNOSIS — M25.661 STIFFNESS OF RIGHT KNEE: Primary | ICD-10-CM

## 2023-08-24 DIAGNOSIS — M25.561 ACUTE PAIN OF RIGHT KNEE: ICD-10-CM

## 2023-08-24 PROCEDURE — 97112 NEUROMUSCULAR REEDUCATION: CPT

## 2023-08-24 PROCEDURE — 97110 THERAPEUTIC EXERCISES: CPT

## 2023-08-24 PROCEDURE — 97530 THERAPEUTIC ACTIVITIES: CPT

## 2023-08-24 NOTE — PROGRESS NOTES
"Physical Therapy Treatment Note     Name: Lucina Castillo  Clinic Number: [id number]    Therapy Diagnosis:   No diagnosis found.    Physician: Jose Carreon FNP    Visit Date: 8/24/2023    Physician Orders: PT Eval and Treat  Medical Diagnosis from Referral: contusion of R knee  Evaluation Date: 7/26/2023  Authorization Period Expiration: 07/20/24  Plan of Care Expiration: 09/01/23  Progress Note Due: 09/01/23  Visit # / Visits authorized: 8/9  FOTO: 59 at IE  PTA Visit #: 0/5    Time In: 04:51PM  Time Out:  05:31PM  Total Billable Time:  40 min    Precautions: Standard  Functional Level Prior to Evaluation: no limitations    Subjective     Pt reports: continues to feel good at this point without pain.      Response to previous treatment: fair initial response  Functional change: working at this point with mod pain    Pain: 0/10  Location: R knee    Objective     LUCINA received therapeutic exercises to develop strength, endurance, ROM, and flexibility for 23 minutes including:  Hamstring Curls [id number]  [place] SLR 0v1h4nsf holds  Prone Quad Sets 51f21bod  Prone Quad Stretch 1d42ofu  Weighted Raman Stretch 3n8m7pdi each w/ 3lb cuff weights  Hurdler Stretch--throughout session--30 sec holds    LUCINA participated in neuromuscular re-education activities to improve: Balance, Coordination, Kinesthetic, Sense, and Proprioception for 9 minutes. The following activities were included:  Leg Press [id number]  TRX Squats 92t1pew  Step Ups 4x5 to 6" step    LUCINA participated in dynamic functional therapeutic activities to improve functional performance for 8  minutes, including:  Upright bike x 8 min    Home Exercises Provided and Patient Education Provided     Education provided: Review of HEP     Written Home Exercises Provided: Patient instructed to cont prior HEP.  Exercises were reviewed and LUCINA was able to demonstrate them prior to the end of the session.  LUCINA demonstrated good  understanding of the " "education provided.     See EMR under Patient Instructions for exercises provided prior visit.    Assessment     Making good improvement at this point.  Overall doing well.  Has one more visit approved which we will go ahead and use to ensure that she continues to do well without pain at work.  Will discharge at next visit.   CHARLOTTE Is progressing well towards her goals.   Pt prognosis is Good.     Pt will continue to benefit from skilled outpatient physical therapy to address the deficits listed in the problem list box on initial evaluation, provide pt/family education and to maximize pt's level of independence in the home and community environment.     Pt's spiritual, cultural and educational needs considered and pt agreeable to plan of care and goals.     Anticipated barriers to physical therapy: possible symptom magnification, fear avoidance behaviors.     Goals:  Short Term Goals: 2 weeks   Pt will be independent with HEP for continued progression beyond skilled therapy. MET  Pt will be able to sleep through the night without waking due to R knee pain. MET  Pt will present to therapy with 0 deg resting R knee extension with no lag with SLR to demonstrate improved terminal extensor mechanism. MET  Pt will be able to return to driving without pain or limitation from R knee.  MET     Long Term Goals: 4 weeks   Pt will be able to negotiate stairs reciprocally without pain or limitation from R LE. MET  Pt will be able to perform a lateral step down from a 6" step without pain or limitation from R knee. Still difficulty and mildly painful   Pt will be able to perform a bodyweight squat without pain or limitation from R knee. Still difficulty and mildly painful   Pt will be able to amb I without deviation or AD.MET    Plan      Plan of care Certification: 08/25/23-09/01/23     Outpatient Physical Therapy 1 times weekly for 1 weeks to include the following interventions: Electrical Stimulation Unattended NMES, Manual " Therapy, Neuromuscular Re-ed, Patient Education, Therapeutic Activities, and Therapeutic Exercise.     Simeon Michelle, PT  8/24/2023

## 2023-08-25 NOTE — PLAN OF CARE
"Physical Therapy Progress Note     Name: Lucina Castillo  Clinic Number: [id number]    Therapy Diagnosis:   No diagnosis found.    Physician: Jose Carreon FNP    Visit Date: 8/24/2023    Physician Orders: PT Eval and Treat  Medical Diagnosis from Referral: contusion of R knee  Evaluation Date: 7/26/2023  Authorization Period Expiration: 07/20/24  Plan of Care Expiration: 09/01/23  Progress Note Due: 09/01/23  Visit # / Visits authorized: 8/9  FOTO: 59 at IE  PTA Visit #: 0/5    Time In: 04:51PM  Time Out:  05:31PM  Total Billable Time:  40 min    Precautions: Standard  Functional Level Prior to Evaluation: no limitations    Subjective     Pt reports: continues to feel good at this point without pain.      Response to previous treatment: fair initial response  Functional change: working at this point with mod pain    Pain: 0/10  Location: R knee    Objective     LUCINA received therapeutic exercises to develop strength, endurance, ROM, and flexibility for 23 minutes including:  Hamstring Curls [id number]  [place] SLR 9w9z4wae holds  Prone Quad Sets 56f08pjg  Prone Quad Stretch 5x54uxf  Weighted Raman Stretch 3s1l4hbd each w/ 3lb cuff weights  Hurdler Stretch--throughout session--30 sec holds    LUCINA participated in neuromuscular re-education activities to improve: Balance, Coordination, Kinesthetic, Sense, and Proprioception for 9 minutes. The following activities were included:  Leg Press 3x46z32wbg  TRX Squats 60k7ulm  Step Ups 4x5 to 6" step    LUCINA participated in dynamic functional therapeutic activities to improve functional performance for 8  minutes, including:  Upright bike x 8 min    Home Exercises Provided and Patient Education Provided     Education provided: Review of HEP     Written Home Exercises Provided: Patient instructed to cont prior HEP.  Exercises were reviewed and LUCINA was able to demonstrate them prior to the end of the session.  LUCINA demonstrated good  understanding of the " "education provided.     See EMR under Patient Instructions for exercises provided prior visit.    Assessment     Making good improvement at this point.  Overall doing well.  Has one more visit approved which we will go ahead and use to ensure that she continues to do well without pain at work.  Will discharge at next visit.   CHARLOTTE Is progressing well towards her goals.   Pt prognosis is Good.     Pt will continue to benefit from skilled outpatient physical therapy to address the deficits listed in the problem list box on initial evaluation, provide pt/family education and to maximize pt's level of independence in the home and community environment.     Pt's spiritual, cultural and educational needs considered and pt agreeable to plan of care and goals.     Anticipated barriers to physical therapy: possible symptom magnification, fear avoidance behaviors.     Goals:  Short Term Goals: 2 weeks   Pt will be independent with HEP for continued progression beyond skilled therapy. MET  Pt will be able to sleep through the night without waking due to R knee pain. MET  Pt will present to therapy with 0 deg resting R knee extension with no lag with SLR to demonstrate improved terminal extensor mechanism. MET  Pt will be able to return to driving without pain or limitation from R knee.  MET     Long Term Goals: 4 weeks   Pt will be able to negotiate stairs reciprocally without pain or limitation from R LE. MET  Pt will be able to perform a lateral step down from a 6" step without pain or limitation from R knee. Still difficulty and mildly painful   Pt will be able to perform a bodyweight squat without pain or limitation from R knee. Still difficulty and mildly painful   Pt will be able to amb I without deviation or AD.MET    Plan      Plan of care Certification: 08/25/23-09/01/23     Outpatient Physical Therapy 1 times weekly for 1 weeks to include the following interventions: Electrical Stimulation Unattended NMES, Manual " Therapy, Neuromuscular Re-ed, Patient Education, Therapeutic Activities, and Therapeutic Exercise.     Simeon Michelle, PT  8/24/2023

## 2023-09-01 ENCOUNTER — CLINICAL SUPPORT (OUTPATIENT)
Dept: REHABILITATION | Facility: HOSPITAL | Age: 48
End: 2023-09-01
Payer: COMMERCIAL

## 2023-09-01 DIAGNOSIS — M25.661 STIFFNESS OF RIGHT KNEE: Primary | ICD-10-CM

## 2023-09-01 DIAGNOSIS — M25.561 ACUTE PAIN OF RIGHT KNEE: ICD-10-CM

## 2023-09-01 PROCEDURE — 97110 THERAPEUTIC EXERCISES: CPT

## 2023-09-01 PROCEDURE — 97530 THERAPEUTIC ACTIVITIES: CPT

## 2023-09-01 PROCEDURE — 97112 NEUROMUSCULAR REEDUCATION: CPT

## 2023-09-01 NOTE — PROGRESS NOTES
"Physical Therapy Treatment Note     Name: Lucina Castillo  Clinic Number: [id number]    Therapy Diagnosis:   No diagnosis found.    Physician: Jose Carreon FNP    Visit Date: 9/1/2023    Physician Orders: PT Eval and Treat  Medical Diagnosis from Referral: contusion of R knee  Evaluation Date: 7/26/2023  Authorization Period Expiration: 07/20/24  Plan of Care Expiration: 09/01/23  Progress Note Due: 09/01/23  Visit # / Visits authorized: 9/9  PTA Visit #: 0/5    Time In: 10:15AM  Time Out:  10:55AM  Total Billable Time:  40 min (26 min co-treated by Trever Adams PTA; 14 min billed by myself)    Precautions: Standard  Functional Level Prior to Evaluation: no limitations    Subjective     Pt reports: feeling good without pain in about a week.     Response to previous treatment: good response  Functional change: working at this point with mod pain    Pain: 0/10  Location: R knee    Objective     LUCINA received therapeutic exercises to develop strength, endurance, ROM, and flexibility for 23 minutes including:  Hamstring Curls 7f51s65wyr  Prone Quad Sets 85u51xux  Prone Quad Stretch 0u52yhp  Weighted Raman Stretch 5v4r5nck each w/ 3lb cuff weights  Hurdler Stretch--throughout session--30 sec holds  [employer] Extensions [id number]    LUCINA participated in neuromuscular re-education activities to improve: Balance, Coordination, Kinesthetic, Sense, and Proprioception for 9 minutes. The following activities were included:  QS w/ WS on slant Board 22b7hur  Leg Press 7a84r89yia  TRX Squats 92d8cpq  Step Ups 4x5 to 6" step    LUCINA participated in dynamic functional therapeutic activities to improve functional performance for 8  minutes, including:  Upright bike x 8 min    Home Exercises Provided and Patient Education Provided     Education provided: Review of HEP     Written Home Exercises Provided: Patient instructed to cont prior HEP.  Exercises were reviewed and LUCINA was able to demonstrate them prior to the end of the " "session.  CHARLOTTE demonstrated good  understanding of the education provided.     See EMR under Patient Instructions for exercises provided prior visit.    Assessment     Has done well overall.  Still getting a "feeling like a rubber band is in my knee" but denies pain with it and states that she feels good.  No issues with any activities performed today.  Reviewed final HEP.  Will discharge at this time. Final FOTO of 77.   CHARLOTTE Is progressing well towards her goals.   Pt prognosis is Good.     Pt will continue to benefit from skilled outpatient physical therapy to address the deficits listed in the problem list box on initial evaluation, provide pt/family education and to maximize pt's level of independence in the home and community environment.     Pt's spiritual, cultural and educational needs considered and pt agreeable to plan of care and goals.     Anticipated barriers to physical therapy: possible symptom magnification, fear avoidance behaviors.     Goals:  Short Term Goals: 2 weeks   Pt will be independent with HEP for continued progression beyond skilled therapy. MET  Pt will be able to sleep through the night without waking due to R knee pain. MET  Pt will present to therapy with 0 deg resting R knee extension with no lag with SLR to demonstrate improved terminal extensor mechanism. MET  Pt will be able to return to driving without pain or limitation from R knee.  MET     Long Term Goals: 4 weeks   Pt will be able to negotiate stairs reciprocally without pain or limitation from R LE. MET  Pt will be able to perform a lateral step down from a 6" step without pain or limitation from R knee. MET  Pt will be able to perform a bodyweight squat without pain or limitation from R knee. MET  Pt will be able to amb I without deviation or AD.MET    Plan      Plan of care Certification: 08/25/23-09/01/23     Discharge due to goals being met and pt working without limitation.     Simeon Michelle, PT  9/1/2023        "

## 2023-09-01 NOTE — PLAN OF CARE
"Physical Therapy Discharge Summary     Name: Lucina Castillo  Clinic Number: [id number]    Therapy Diagnosis:   No diagnosis found.    Physician: Jose Carreon FNP    Visit Date: 9/1/2023    Physician Orders: PT Eval and Treat  Medical Diagnosis from Referral: contusion of R knee  Evaluation Date: 7/26/2023  Authorization Period Expiration: 07/20/24  Plan of Care Expiration: 09/01/23  Progress Note Due: 09/01/23  Visit # / Visits authorized: 9/9  PTA Visit #: 0/5    Time In: 10:15AM  Time Out:  10:55AM  Total Billable Time:  40 min (26 min co-treated by Trever Adams PTA; 14 min billed by myself)    Precautions: Standard  Functional Level Prior to Evaluation: no limitations    Subjective     Pt reports: feeling good without pain in about a week.     Response to previous treatment: good response  Functional change: working at this point with mod pain    Pain: 0/10  Location: R knee    Objective     LUCINA received therapeutic exercises to develop strength, endurance, ROM, and flexibility for 23 minutes including:  Hamstring Curls [id number]  Prone Quad Sets 37h49kay  Prone Quad Stretch 6c13mlj  Weighted Raman Stretch 3m9r3eue each w/ 3lb cuff weights  Hurdler Stretch--throughout session--30 sec holds  [employer] Extensions [id number]    LUCINA participated in neuromuscular re-education activities to improve: Balance, Coordination, Kinesthetic, Sense, and Proprioception for 9 minutes. The following activities were included:  QS w/ WS on slant Board 25e7cpu  Leg Press 0c19i17voe  TRX Squats 21x2nhz  Step Ups 4x5 to 6" step    LUCINA participated in dynamic functional therapeutic activities to improve functional performance for 8  minutes, including:  Upright bike x 8 min    Home Exercises Provided and Patient Education Provided     Education provided: Review of HEP     Written Home Exercises Provided: Patient instructed to cont prior HEP.  Exercises were reviewed and LUCINA was able to demonstrate them prior to the end of " "the session.  CHARLOTTE demonstrated good  understanding of the education provided.     See EMR under Patient Instructions for exercises provided prior visit.    Assessment     Has done well overall.  Still getting a "feeling like a rubber band is in my knee" but denies pain with it and states that she feels good.  No issues with any activities performed today.  Reviewed final HEP.  Will discharge at this time. Final FOTO of 77.   CHARLOTTE Is progressing well towards her goals.   Pt prognosis is Good.     Pt will continue to benefit from skilled outpatient physical therapy to address the deficits listed in the problem list box on initial evaluation, provide pt/family education and to maximize pt's level of independence in the home and community environment.     Pt's spiritual, cultural and educational needs considered and pt agreeable to plan of care and goals.     Anticipated barriers to physical therapy: possible symptom magnification, fear avoidance behaviors.     Goals:  Short Term Goals: 2 weeks   Pt will be independent with HEP for continued progression beyond skilled therapy. MET  Pt will be able to sleep through the night without waking due to R knee pain. MET  Pt will present to therapy with 0 deg resting R knee extension with no lag with SLR to demonstrate improved terminal extensor mechanism. MET  Pt will be able to return to driving without pain or limitation from R knee.  MET     Long Term Goals: 4 weeks   Pt will be able to negotiate stairs reciprocally without pain or limitation from R LE. MET  Pt will be able to perform a lateral step down from a 6" step without pain or limitation from R knee. MET  Pt will be able to perform a bodyweight squat without pain or limitation from R knee. MET  Pt will be able to amb I without deviation or AD.MET    Plan      Plan of care Certification: 08/25/23-09/01/23     Discharge due to goals being met and pt working without limitation.     Simeon Michelle, PT  9/1/2023      "

## 2024-08-26 ENCOUNTER — CLINICAL SUPPORT (OUTPATIENT)
Dept: PRIMARY CARE CLINIC | Facility: CLINIC | Age: 49
End: 2024-08-26

## 2024-08-26 DIAGNOSIS — Z01.10 ENCOUNTER FOR HEARING EXAMINATION, UNSPECIFIED WHETHER ABNORMAL FINDINGS: Primary | ICD-10-CM

## 2024-08-26 PROCEDURE — 92552 PURE TONE AUDIOMETRY AIR: CPT | Mod: ,,, | Performed by: NURSE PRACTITIONER

## 2024-08-26 NOTE — PROGRESS NOTES
Subjective     Patient ID: Lucina Castillo is a 49 y.o. female.    Chief Complaint: No chief complaint on file.    HPI  Review of Systems       Objective     Physical Exam       Assessment and Plan     1. Encounter for hearing examination, unspecified whether abnormal findings        Audiogram only            No follow-ups on file.

## 2025-08-14 ENCOUNTER — HOSPITAL ENCOUNTER (EMERGENCY)
Facility: HOSPITAL | Age: 50
Discharge: HOME | End: 2025-08-14
Attending: EMERGENCY MEDICINE

## 2025-08-14 VITALS
TEMPERATURE: 98 F | DIASTOLIC BLOOD PRESSURE: 72 MMHG | HEIGHT: 67 IN | WEIGHT: 160 LBS | HEART RATE: 62 BPM | BODY MASS INDEX: 25.11 KG/M2 | RESPIRATION RATE: 17 BRPM | OXYGEN SATURATION: 100 % | SYSTOLIC BLOOD PRESSURE: 123 MMHG

## 2025-08-14 DIAGNOSIS — D64.9 SYMPTOMATIC ANEMIA: ICD-10-CM

## 2025-08-14 DIAGNOSIS — R07.9 CHEST PAIN: ICD-10-CM

## 2025-08-14 DIAGNOSIS — R00.2 PALPITATIONS: Primary | ICD-10-CM

## 2025-08-14 LAB
ALBUMIN SERPL BCP-MCNC: 3.8 G/DL (ref 3.5–5)
ALBUMIN/GLOB SERPL: 1.1 {RATIO}
ALP SERPL-CCNC: 66 U/L (ref 40–150)
ALT SERPL W P-5'-P-CCNC: 12 U/L
ANION GAP SERPL CALCULATED.3IONS-SCNC: 7 MMOL/L (ref 7–16)
ANISOCYTOSIS BLD QL SMEAR: NORMAL
AST SERPL W P-5'-P-CCNC: 24 U/L (ref 11–45)
B-HCG UR QL: NEGATIVE
BASOPHILS # BLD AUTO: 0.04 K/UL (ref 0–0.2)
BASOPHILS NFR BLD AUTO: 0.9 % (ref 0–1)
BILIRUB SERPL-MCNC: 0.3 MG/DL
BUN SERPL-MCNC: 9 MG/DL (ref 7–19)
BUN/CREAT SERPL: 9 (ref 6–20)
CALCIUM SERPL-MCNC: 8.8 MG/DL (ref 8.4–10.2)
CHLORIDE SERPL-SCNC: 111 MMOL/L (ref 98–107)
CO2 SERPL-SCNC: 24 MMOL/L (ref 22–29)
CREAT SERPL-MCNC: 1.03 MG/DL (ref 0.55–1.02)
CTP QC/QA: YES
DIFFERENTIAL METHOD BLD: ABNORMAL
DIRECT COOMBS (DAT): NORMAL
EGFR (NO RACE VARIABLE) (RUSH/TITUS): 67 ML/MIN/1.73M2
EOSINOPHIL # BLD AUTO: 0.26 K/UL (ref 0–0.5)
EOSINOPHIL NFR BLD AUTO: 5.7 % (ref 1–4)
ERYTHROCYTE [DISTWIDTH] IN BLOOD BY AUTOMATED COUNT: 23.1 % (ref 11.5–14.5)
FOLATE SERPL-MCNC: 6.1 NG/ML (ref 7–31.4)
GLOBULIN SER-MCNC: 3.4 G/DL (ref 2–4)
GLUCOSE SERPL-MCNC: 95 MG/DL (ref 74–100)
HCT VFR BLD AUTO: 22.2 % (ref 38–47)
HGB BLD-MCNC: 6 G/DL (ref 12–16)
HYPOCHROMIA BLD QL SMEAR: NORMAL
IMM GRANULOCYTES # BLD AUTO: 0.01 K/UL (ref 0–0.04)
IMM GRANULOCYTES NFR BLD: 0.2 % (ref 0–0.4)
IMM RETICS NFR: 23.5 % (ref 3–15.9)
IRON SATN MFR SERPL: ABNORMAL %
IRON SERPL-MCNC: <7 UG/DL (ref 50–170)
LYMPHOCYTES # BLD AUTO: 1.11 K/UL (ref 1–4.8)
LYMPHOCYTES NFR BLD AUTO: 24.2 % (ref 27–41)
MCH RBC QN AUTO: 17 PG (ref 27–31)
MCHC RBC AUTO-ENTMCNC: 27 G/DL (ref 32–36)
MCV RBC AUTO: 62.9 FL (ref 80–96)
MICROCYTES BLD QL SMEAR: NORMAL
MONOCYTES # BLD AUTO: 0.6 K/UL (ref 0–0.8)
MONOCYTES NFR BLD AUTO: 13.1 % (ref 2–6)
MPC BLD CALC-MCNC: 9.2 FL (ref 9.4–12.4)
NEUTROPHILS # BLD AUTO: 2.56 K/UL (ref 1.8–7.7)
NEUTROPHILS NFR BLD AUTO: 55.9 % (ref 53–65)
NRBC # BLD AUTO: 0 X10E3/UL
NRBC, AUTO (.00): 0 %
NT-PROBNP SERPL-MCNC: 145 PG/ML (ref 1–125)
PLATELET # BLD AUTO: 395 K/UL (ref 150–400)
PLATELET MORPHOLOGY: NORMAL
POC OCCULT BLOOD STOOL: NEGATIVE
POLYCHROMASIA BLD QL SMEAR: NORMAL
POTASSIUM SERPL-SCNC: 4.1 MMOL/L (ref 3.5–5.1)
PROT SERPL-MCNC: 7.2 G/DL (ref 6.4–8.3)
RBC # BLD AUTO: 3.53 M/UL (ref 4.2–5.4)
RETICS # AUTO: 0.05 X10E6/UL (ref 0.02–0.11)
RETICS/RBC NFR AUTO: 1.4 % (ref 0.4–2.2)
SCHISTOCYTES BLD QL AUTO: NORMAL
SODIUM SERPL-SCNC: 138 MMOL/L (ref 136–145)
TARGETS BLD QL SMEAR: NORMAL
TIBC SERPL-MCNC: 317 UG/DL (ref 70–310)
TIBC SERPL-MCNC: ABNORMAL UG/DL
TRANSFERRIN SERPL-MCNC: 309 MG/DL (ref 180–382)
TROPONIN I SERPL HS-MCNC: 18 NG/L
TROPONIN I SERPL HS-MCNC: 22.5 NG/L
TSH SERPL DL<=0.005 MIU/L-ACNC: 1.02 UIU/ML (ref 0.35–4.94)
VIT B12 SERPL-MCNC: 438 PG/ML (ref 213–816)
WBC # BLD AUTO: 4.58 K/UL (ref 4.5–11)

## 2025-08-14 PROCEDURE — 36415 COLL VENOUS BLD VENIPUNCTURE: CPT | Performed by: NURSE PRACTITIONER

## 2025-08-14 PROCEDURE — 86880 COOMBS TEST DIRECT: CPT | Performed by: EMERGENCY MEDICINE

## 2025-08-14 PROCEDURE — 85025 COMPLETE CBC W/AUTO DIFF WBC: CPT | Performed by: NURSE PRACTITIONER

## 2025-08-14 PROCEDURE — 83540 ASSAY OF IRON: CPT | Performed by: EMERGENCY MEDICINE

## 2025-08-14 PROCEDURE — 99284 EMERGENCY DEPT VISIT MOD MDM: CPT | Mod: 25

## 2025-08-14 PROCEDURE — 84484 ASSAY OF TROPONIN QUANT: CPT | Performed by: NURSE PRACTITIONER

## 2025-08-14 PROCEDURE — 81025 URINE PREGNANCY TEST: CPT | Performed by: EMERGENCY MEDICINE

## 2025-08-14 PROCEDURE — 93005 ELECTROCARDIOGRAM TRACING: CPT

## 2025-08-14 PROCEDURE — 82272 OCCULT BLD FECES 1-3 TESTS: CPT

## 2025-08-14 PROCEDURE — 85045 AUTOMATED RETICULOCYTE COUNT: CPT | Performed by: EMERGENCY MEDICINE

## 2025-08-14 PROCEDURE — 82746 ASSAY OF FOLIC ACID SERUM: CPT | Performed by: EMERGENCY MEDICINE

## 2025-08-14 PROCEDURE — 82607 VITAMIN B-12: CPT | Performed by: EMERGENCY MEDICINE

## 2025-08-14 PROCEDURE — 83880 ASSAY OF NATRIURETIC PEPTIDE: CPT | Performed by: NURSE PRACTITIONER

## 2025-08-14 PROCEDURE — 84443 ASSAY THYROID STIM HORMONE: CPT | Performed by: EMERGENCY MEDICINE

## 2025-08-15 ENCOUNTER — TELEPHONE (OUTPATIENT)
Dept: EMERGENCY MEDICINE | Facility: HOSPITAL | Age: 50
End: 2025-08-15